# Patient Record
Sex: FEMALE | Race: WHITE | NOT HISPANIC OR LATINO | Employment: UNEMPLOYED | URBAN - METROPOLITAN AREA
[De-identification: names, ages, dates, MRNs, and addresses within clinical notes are randomized per-mention and may not be internally consistent; named-entity substitution may affect disease eponyms.]

---
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
## 2023-05-02 ENCOUNTER — OFFICE VISIT (OUTPATIENT)
Dept: OBGYN CLINIC | Facility: CLINIC | Age: 18
End: 2023-05-02

## 2023-05-02 VITALS
HEIGHT: 64 IN | DIASTOLIC BLOOD PRESSURE: 80 MMHG | BODY MASS INDEX: 31.34 KG/M2 | WEIGHT: 183.6 LBS | SYSTOLIC BLOOD PRESSURE: 120 MMHG

## 2023-05-02 DIAGNOSIS — Z01.419 ENCOUNTER FOR ANNUAL ROUTINE GYNECOLOGICAL EXAMINATION: Primary | ICD-10-CM

## 2023-05-02 DIAGNOSIS — Z30.011 ENCOUNTER FOR ORAL CONTRACEPTION INITIAL PRESCRIPTION: ICD-10-CM

## 2023-05-02 DIAGNOSIS — Z11.3 SCREEN FOR STD (SEXUALLY TRANSMITTED DISEASE): ICD-10-CM

## 2023-05-02 RX ORDER — NORETHINDRONE ACETATE AND ETHINYL ESTRADIOL 1.5-30(21)
1 KIT ORAL DAILY
Qty: 84 TABLET | Refills: 3 | Status: SHIPPED | OUTPATIENT
Start: 2023-05-02

## 2023-05-02 NOTE — PROGRESS NOTES
Assessment/Plan:    1  Encounter for annual routine gynecological examination      2  Encounter for oral contraception initial prescription    - norethindrone-ethinyl estradiol-iron (Loestrin Fe 1 5/30) 1 5-30 MG-MCG tablet; Take 1 tablet by mouth daily  Dispense: 84 tablet; Refill: 3        Subjective       Bobbi Solorzano is a 21 y o  female who presents for annual exam  Periods are regular every 28-30 days, lasting 7 days  Dysmenorrhea:moderate, occurring first 1-2 days of flow  Cyclic symptoms:  none  No intermenstrual bleeding, spotting, or discharge  She is now sexually active  She would like to start OCP  She also accepts STD screening  Current contraception: condoms  History of abnormal Pap smear: no  Regular self breast exam: yes  History of abnormal mammogram: no  History of abnormal lipids: no    Menstrual History:    Patient's last menstrual period was 04/15/2023    Period Cycle (Days): 28  Period Duration (Days): 7  Period Pattern: Regular  Menstrual Flow: Moderate  Dysmenorrhea: (!) Moderate  Dysmenorrhea Symptoms: Cramping    Past Medical History:   Diagnosis Date    Allergic conjunctivitis of both eyes 06/18/2020    Diarrhea     Hashimoto's disease     Hay fever 06/18/2020    Headache     Hypothyroidism 2011    Vomiting     Wears contact lenses     Wears glasses     Weight gain        Family History   Problem Relation Age of Onset    Kidney nephrosis Mother     Hypothyroidism Mother     Allergies Mother     Other Mother         allergic to sulfa     Hyperlipidemia Mother     Thyroid disease Mother         hypo as well    Thyroid disease unspecified Paternal Grandmother     Cancer Paternal Grandmother         CERVICAL - surgery    Asthma Father     Allergies Father     Hyperlipidemia Father     Asthma Brother     Hyperlipidemia Brother     Cancer Maternal Grandmother         UTERINE - Fatal       The following portions of the patient's history were reviewed and updated as "appropriate: allergies, current medications, past family history, past medical history, past social history, past surgical history and problem list     Review of Systems  Pertinent items are noted in HPI  Objective      /80 (BP Location: Right arm, Patient Position: Sitting, Cuff Size: Standard)   Ht 5' 3 78\" (1 62 m)   Wt 83 3 kg (183 lb 9 6 oz)   LMP 04/15/2023   BMI 31 73 kg/m²     General:   alert and oriented, in no acute distress, appears stated age and cooperative   Heart:  Breasts: regular rate and rhythm   appear normal, no suspicious masses, no skin or nipple changes or axillary nodes     Lungs: stridor   Abdomen: soft, non-tender, without masses or organomegaly   Vulva: normal   Vagina: normal mucosa   Cervix: no lesions   Uterus: normal size, mobile, non-tender   Adnexa: normal adnexa and no mass, fullness, tenderness             "

## 2023-05-03 LAB
C TRACH DNA SPEC QL NAA+PROBE: NEGATIVE
N GONORRHOEA DNA SPEC QL NAA+PROBE: NEGATIVE

## 2023-08-22 ENCOUNTER — TELEPHONE (OUTPATIENT)
Dept: PEDIATRICS CLINIC | Facility: CLINIC | Age: 18
End: 2023-08-22

## 2023-08-31 NOTE — TELEPHONE ENCOUNTER
08/31/23 8:48 AM        The office's request has been received, reviewed, and the patient chart updated. The PCP has successfully been removed with a patient attribution note. This message will now be completed.         Thank you  Jory Lockwood